# Patient Record
(demographics unavailable — no encounter records)

---

## 2025-03-07 NOTE — HISTORY OF PRESENT ILLNESS
[[facility]] : at [facility] [Born at ___ Wks Gestation] : The patient was born at [unfilled] weeks gestation [] : via normal spontaneous vaginal delivery [(1) _____] : [unfilled] [(5) _____] : [unfilled] [BW: _____] : weight of [unfilled] [Length: _____] : length of [unfilled] [HC: _____] : head circumference of [unfilled] [DW: _____] : Discharge weight was [unfilled] [Age: ___] : [unfilled] year old mother [G: ___] : G [unfilled] [GBS] : GBS positive [Rubella (Immune)] : Rubella immune [None] : There are no risk factors [TcB: _____] : Transcutaneous Bilirubin [unfilled] mg/dL [Yes] : Yes [Formula ___ oz/feed] : [unfilled] oz of formula per feed [Hours between feeds ___] : Child is fed every [unfilled] hours [Normal] : Normal [Green/brown] : green/brown [Yellow] : yellow [Seedy] : seedy [In Bassinet/Crib] : sleeps in bassinet/crib [On back] : sleeps on back [No] : No cigarette smoke exposure [Water heater temperature set at <120 degrees F] : Water heater temperature set at <120 degrees F [Rear facing car seat in back seat] : Rear facing car seat in back seat [Carbon Monoxide Detectors] : Carbon monoxide detectors at home [Smoke Detectors] : Smoke detectors at home. [Hepatitis B Vaccine Given] : Hepatitis B vaccine given [HepBsAG] : HepBsAg negative [HIV] : HIV negative [VDRL/RPR (Reactive)] : VDRL/RPR nonreactive [de-identified] : 24 [FreeTextEntry8] : Ampicillin x5 for maternal GBS+ [Co-sleeping] : no co-sleeping [Exposure to electronic nicotine delivery system] : No exposure to electronic nicotine delivery system

## 2025-03-07 NOTE — PHYSICAL EXAM
[Alert] : alert [Normocephalic] : normocephalic [Flat Open Anterior [place]] : flat open anterior fontanelle [PERRL] : PERRL [Red Reflex Bilateral] : red reflex bilateral [Normally Placed Ears] : normally placed ears [Auricles Well Formed] : auricles well formed [Clear Tympanic membranes] : clear tympanic membranes [Light reflex present] : light reflex present [Bony structures visible] : bony structures visible [Patent Auditory Canal] : patent auditory canal [Nares Patent] : nares patent [Palate Intact] : palate intact [Uvula Midline] : uvula midline [Supple, full passive range of motion] : supple, full passive range of motion [Symmetric Chest Rise] : symmetric chest rise [Clear to Auscultation Bilaterally] : clear to auscultation bilaterally [Regular Rate and Rhythm] : regular rate and rhythm [S1, S2 present] : S1, S2 present [+2 Femoral Pulses] : +2 femoral pulses [Soft] : soft [Bowel Sounds] : bowel sounds present [Umbilical Stump Dry, Clean, Intact] : umbilical stump dry, clean, intact [Normal external genitalia] : normal external genitalia [Patent Vagina] : patent vagina [Patent] : patent [Normally Placed] : normally placed [No Abnormal Lymph Nodes Palpated] : no abnormal lymph nodes palpated [Symmetric Flexed Extremities] : symmetric flexed extremities [Startle Reflex] : startle reflex present [Suck Reflex] : suck reflex present [Rooting] : rooting reflex present [Palmar Grasp] : palmar grasp present [Plantar Grasp] : plantar reflex present [Symmetric Noa] : symmetric [place] [Acute Distress] : no acute distress [Icteric sclera] : nonicteric sclera [Discharge] : no discharge [Palpable Masses] : no palpable masses [Murmurs] : no murmurs [Tender] : nontender [Distended] : not distended [Hepatomegaly] : no hepatomegaly [Splenomegaly] : no splenomegaly [Clitoromegaly] : no clitoromegaly [Carrillo-Ortolani] : negative Carrillo-Ortolani [Spinal Dimple] : no spinal dimple [Tuft of Hair] : no tuft of hair [Jaundice] : not jaundice [de-identified] : brown nevus on the lower leg

## 2025-03-07 NOTE — DEVELOPMENTAL MILESTONES
[Normal Development] : Normal Development [None] : none [Makes brief eye contact] : makes brief eye contact [Cries with discomfort] : cries with discomfort [Reflexively moves arms and legs] : reflexively moves arms and legs [Turns head to side when on stomach] : turns head to side when on stomach [Holds fingers closed] : holds fingers closed [Grasps reflexively] : grasp reflexively

## 2025-03-07 NOTE — DISCUSSION/SUMMARY
[Normal Growth] : growth [Normal Development] : developmental [No Elimination Concerns] : elimination [Continue Regimen] : feeding [Normal Sleep Pattern] : sleep [None] : no known medical problems [Anticipatory Guidance Given] : Anticipatory guidance addressed as per the history of present illness section [ Transition] :  transition [ Care] :  care [Nutritional Adequacy] : nutritional adequacy [Parental Well-Being] : parental well-being [Safety] : safety [No Vaccines] : no vaccines needed [No Medications] : ~He/She~ is not on any medications [Hepatitis B In Hospital] : Hepatitis B administered while in the hospital [Mother] : mother [Father] : father [Parental Concerns Addressed] : Parental concerns addressed [FreeTextEntry1] : Recommend exclusive breastfeeding, 8-12 feedings per day. Mother should continue prenatal vitamins and avoid alcohol. If formula is needed, recommend iron-fortified formulations every 2-3 hrs. When in car, patient should be in rear-facing car seat in back seat. Air dry umbilical stump. Put baby to sleep on back, in own crib with no loose or soft bedding. Limit baby's exposure to others, especially those with fever or unknown vaccine status. Return in 48 hrs for a weight check

## 2025-03-12 NOTE — PHYSICAL EXAM
[Consolable] : consolable [NL] : warm, clear [Irritable] : not irritable [FreeTextEntry9] : umbilical cord dry [de-identified] : NO jaundice on exam.

## 2025-03-12 NOTE — HISTORY OF PRESENT ILLNESS
[de-identified] : weight [FreeTextEntry6] : Presents for weight check.  Gained 4 oz in 3 days.  Kendamil formula 2oz every 2-3 hrs. NO spit ups - feeding well.  Increased wake windows.  Good UO/wet diapers//BM - yellow/seedy/loose -- after almost every feeding.  Umbilical cord dry// intact.  Gen questions//No other concerns at this time.

## 2025-03-12 NOTE — PHYSICAL EXAM
[Consolable] : consolable [NL] : warm, clear [Irritable] : not irritable [FreeTextEntry9] : umbilical cord dry [de-identified] : NO jaundice on exam.

## 2025-03-12 NOTE — HISTORY OF PRESENT ILLNESS
[de-identified] : weight [FreeTextEntry6] : Presents for weight check.  Gained 4 oz in 3 days.  Kendamil formula 2oz every 2-3 hrs. NO spit ups - feeding well.  Increased wake windows.  Good UO/wet diapers//BM - yellow/seedy/loose -- after almost every feeding.  Umbilical cord dry// intact.  Gen questions//No other concerns at this time.

## 2025-03-12 NOTE — DISCUSSION/SUMMARY
[FreeTextEntry1] :  7  day old female for weight check, doing well with good weight gain  Recommend to continue current formula - may slowly increase volume as tolerated, infant to feed every 2-3 hrs. To start vit D.  Signs of jaundice reviewed. NONE on exam.  When in car, patient should be in rear-facing car seat in back seat.  Air dry umbilical stump. If any discharge after cord off to call office.  Put baby to sleep on back, in own crib with no loose or soft bedding.  Limit baby's exposure to others, especially those with fever or unknown vaccine status. Masking, social distancing and hand hygiene reviewed. Weight check 1 week Well care at 1 month Return sooner PRN Parents without questions.

## 2025-03-17 NOTE — HISTORY OF PRESENT ILLNESS
[de-identified] : weight [FreeTextEntry6] : Presents for weight check.  Gained 11.5oz in 7 days.  Kendamil formula 3oz every 2-3 hrs.  Good UO/wet diapers -after almost every feeding// BM - yellow/seedy/loose 1x/day.  Gassy.  Umbilical cord hanging.  No other concerns at this time. Never smoker

## 2025-03-17 NOTE — PHYSICAL EXAM
[NL] : warm, clear [Lethargic] : not lethargic [Consolable] : consolable [FreeTextEntry9] : Umbilical granuloma cauterized today in office. Procedure well tolerated. Thin area of attachment.  [de-identified] : left middle finger near nail scabbed. +hyperpigmented nevi on LE.

## 2025-03-17 NOTE — DISCUSSION/SUMMARY
[FreeTextEntry1] :  14 day old female for weight check, with umbilical granuloma, doing well.  Recommend iron-fortified formulations every 2-3 hrs.   Signs of jaundice reviewed. NONE on exam.  Umbilical granuloma cauterized today in office. Procedure well tolerated.  When in car, patient should be in rear-facing car seat in back seat.  Put baby to sleep on back, in own crib with no loose or soft bedding.  Limit baby's exposure to others, especially those with fever or unknown vaccine status. Masking, social distancing and hand hygiene reviewed. Well care at 1 month Return sooner PRN Parents without questions.

## 2025-05-06 NOTE — HISTORY OF PRESENT ILLNESS
n/a [Mother] : mother [Formula ___ oz/feed] : [unfilled] oz of formula per feed [Hours between feeds ___] : Child is fed every [unfilled] hours [Normal] : Normal [In Bassinet/Crib] : sleeps in bassinet/crib [On back] : sleeps on back [Pacifier use] : Pacifier use [No] : No cigarette smoke exposure [Water heater temperature set at <120 degrees F] : Water heater temperature set at <120 degrees F [Rear facing car seat in back seat] : Rear facing car seat in back seat [Carbon Monoxide Detectors] : Carbon monoxide detectors at home [Smoke Detectors] : Smoke detectors at home. [Co-sleeping] : no co-sleeping [Loose bedding, pillow, toys, and/or bumpers in crib] : no loose bedding, pillow, toys, and/or bumpers in crib [Exposure to electronic nicotine delivery system] : No exposure to electronic nicotine delivery system [FreeTextEntry7] : WCC 2 months  [de-identified] : prefers to turn head to left  [de-identified] : Rolf  [de-identified] : Due for Pentacel, Prevnar, Rotavirus

## 2025-05-06 NOTE — DISCUSSION/SUMMARY
[] : The components of the vaccine(s) to be administered today are listed in the plan of care. The disease(s) for which the vaccine(s) are intended to prevent and the risks have been discussed with the caretaker.  The risks are also included in the appropriate vaccination information statements which have been provided to the patient's caregiver.  The caregiver has given consent to vaccinate. [FreeTextEntry1] : 2 month old female presents for WCC. Appears clinically well. No growth, developmental, elimination, feeding, skin and sleep concerns. Referral placed for PT related to mild torticollis.   Recommend exclusive breastfeeding, 8-12 feedings per day. Mother should continue prenatal vitamins and avoid alcohol. If formula is needed, recommend iron-fortified formulations, 2-4 oz every 3-4 hrs. When in car, patient should be in rear-facing car seat in back seat. Put baby to sleep on back, in own crib with no loose or soft bedding. Help baby to maintain sleep and feeding routines. May offer pacifier if needed. Continue tummy time when awake. Parents counseled to call if rectal temperature >100.4 degrees F.    Due for Pentacel, Prevnar and Rotavirus today - risks/benefits/side effects reviewed - VIS given - Mom agrees without questions.   WCC at 4 months. Return sooner if needed.

## 2025-05-06 NOTE — PHYSICAL EXAM
[Alert] : alert [Normocephalic] : normocephalic [Flat Open Anterior [place]] : flat open anterior fontanelle [PERRL] : PERRL [Red Reflex Bilateral] : red reflex bilateral [Normally Placed Ears] : normally placed ears [Auricles Well Formed] : auricles well formed [Clear Tympanic membranes] : clear tympanic membranes [Light reflex present] : light reflex present [Bony landmarks visible] : bony landmarks visible [Nares Patent] : nares patent [Palate Intact] : palate intact [Uvula Midline] : uvula midline [Symmetric Chest Rise] : symmetric chest rise [Clear to Auscultation Bilaterally] : clear to auscultation bilaterally [Regular Rate and Rhythm] : regular rate and rhythm [S1, S2 present] : S1, S2 present [+2 Femoral Pulses] : +2 femoral pulses [Soft] : soft [Bowel Sounds] : bowel sounds present [Normal external genitailia] : normal external genitalia [Patent Vagina] : vagina patent [Normally Placed] : normally placed [No Abnormal Lymph Nodes Palpated] : no abnormal lymph nodes palpated [Symmetric Flexed Extremities] : symmetric flexed extremities [Startle Reflex] : startle reflex present [Suck Reflex] : suck reflex present [Rooting] : rooting reflex present [Palmar Grasp] : palmar grasp reflex present [Plantar Grasp] : plantar grasp reflex present [Symmetric Noa] : symmetric [place] [Acute Distress] : no acute distress [Discharge] : no discharge [Supple, full passive range of motion] : supple, full passive range of motion [Palpable Masses] : no palpable masses [Murmurs] : no murmurs [Tender] : nontender [Distended] : not distended [Hepatomegaly] : no hepatomegaly [Splenomegaly] : no splenomegaly [Clitoromegaly] : no clitoromegaly [Carrillo-Ortolani] : negative Carrillo-Ortolani [Spinal Dimple] : no spinal dimple [Tuft of Hair] : no tuft of hair [Rash and/or lesion present] : no rash/lesion [de-identified] : +tightness of neck muscles on R side with resistance to turning noted [de-identified] : +nevus on L leg

## 2025-07-03 NOTE — PHYSICAL EXAM
[Alert] : alert [Acute Distress] : no acute distress [Normocephalic] : normocephalic [Flat Open Anterior [place]] : flat open anterior fontanelle [Red Reflex] : red reflex bilateral [PERRL] : PERRL [Normally Placed Ears] : normally placed ears [Auricles Well Formed] : auricles well formed [Clear Tympanic membranes] : clear tympanic membranes [Light reflex present] : light reflex present [Bony landmarks visible] : bony landmarks visible [Discharge] : no discharge [Nares Patent] : nares patent [Palate Intact] : palate intact [Uvula Midline] : uvula midline [Palpable Masses] : no palpable masses [Symmetric Chest Rise] : symmetric chest rise [Clear to Auscultation Bilaterally] : clear to auscultation bilaterally [Regular Rate and Rhythm] : regular rate and rhythm [S1, S2 present] : S1, S2 present [Murmurs] : no murmurs [+2 Femoral Pulses] : (+) 2 femoral pulses [Soft] : soft [Tender] : nontender [Distended] : nondistended [Bowel Sounds] : bowel sounds present [Hepatomegaly] : no hepatomegaly [Splenomegaly] : no splenomegaly [Normal External Genitalia] : normal external genitalia [Clitoromegaly] : no clitoromegaly [Normal Vaginal Introitus] : normal vaginal introitus [Patent] : patent [Normally Placed] : normally placed [No Abnormal Lymph Nodes Palpated] : no abnormal lymph nodes palpated [Carrillo-Ortolani] : negative Carrillo-Ortolani [Allis Sign] : negative Allis sign [Spinal Dimple] : no spinal dimple [Tuft of Hair] : no tuft of hair [Startle Reflex] : startle reflex present [Plantar Grasp] : plantar grasp reflex present [Symmetric Noa] : symmetric [place] [Rash or Lesions] : no rash/lesions

## 2025-07-03 NOTE — HISTORY OF PRESENT ILLNESS
[Formula ___ oz/feed] : [unfilled] oz of formula per feed [Hours between feeds ___] : Child is fed every [unfilled] hours [Normal] : Normal [Frequency of stools: ___] : Frequency of stools: [unfilled]  stools [per day] : per day. [In Bassinet/Crib] : sleeps in bassinet/crib [On back] : sleeps on back [Co-sleeping] : no co-sleeping [Sleeps 12-16 hours per 24 hours (including naps)] : sleeps 12-16 hours per 24 hours (including naps) [Loose bedding, pillow, toys, and/or bumpers in crib] : no loose bedding, pillow, toys, and/or bumpers in crib [Pacifier use] : Pacifier use [Tummy time] : tummy time [No] : No cigarette smoke exposure [Water heater temperature set at <120 degrees F] : Water heater temperature set at <120 degrees F [Rear facing car seat in back seat] : Rear facing car seat in back seat [Carbon Monoxide Detectors] : Carbon monoxide detectors at home [Smoke Detectors] : Smoke detectors at home. [FreeTextEntry7] : Doing well, doing PT for torticollis [de-identified] : Rolf

## 2025-07-03 NOTE — DEVELOPMENTAL MILESTONES
[Normal Development] : Normal Development [None] : none [Laughs aloud] : laughs aloud [Turns to voice] : turns to voice [Rolls over prone to supine] : rolls over prone to supine [Supports on elbows & wrists in prone] : supports on elbows and wrists in prone [Keeps hands unfisted] : keeps hands unfisted [Plays with fingers in midline] : plays with fingers in midline [Grasps objects] : grasps objects [FreeTextEntry1] : + back to bell rolling [Passed] : passed [FreeTextEntry2] : 1